# Patient Record
(demographics unavailable — no encounter records)

---

## 2019-03-27 NOTE — NUR
Patient up from ER. Alert and oriented. States pain 8/10 to right back and
flank. Medications given per orders. Patient able to communicate verbally and
read lips. Clip board and paper provided to aid in communication. Denies
further needs at this time.

## 2019-03-27 NOTE — NUR
PT SIGNED CONSENT FORM AND WAS ADVISED THAT SHE WILL BE NPO AFTER MIDNIGHT AND
THAT IS INCLUDING NO ICE CHIPS. PT VERBALIZED AND WROTE DOWN UNDERSTANDING OF
EVERYTHING. CALL LIGHT WITHIN REACH.

## 2019-03-27 NOTE — NUR
DR. MIR IN TO SPEAK WITH PT ABOUT SURGERY TOMORROW OR POSSIBLY
TONIGHT. DR. MIR ADVISED TO NOTIFY HIM IF TEMP IS GREATER THAN 101.0.
ALSO, TO HAVE PT SIGN CONSENT FORM.

## 2019-03-27 NOTE — NUR
Assisted patient to order dinner. Denies pain at this time. Denies further
needs at this time. Will report off to night shift.

## 2019-03-27 NOTE — NUR
PT IN BED WITH HOB AT 30 DEGREE ANGLE, AT TIMES SITS UP. FAMILY STATED THAT
SHE VOMITED, BUT PT COUGHS AND THEN SPITS UP MUCUS. PT IS DEAF, THEREFORE
COMMUNICATION AT TIMES DIFFICULT. PT'S SON CAN HEAR. PT RECEIVED PAIN
MEDICATION EARLIER THIS SHIFT FOR PAIN RATED AT A 7/10 IN RIGHT SIDE. PT ATE
80% OF HER SUPPER AND NO S/S OF PAIN OR DISCOMFORT AT THIS TIME. CALL LIGHT
WITHIN REACH.

## 2019-03-28 NOTE — NUR
UNEVENTFUL NIGHT FOR PT. PT SLEPT/RESTED MOST OF THE NIGHT AFTER MIDNIGHT. DID
CHANGE IV AT 0300, BUT PT WENT BACK TO SLEEP RIGHT AWAY. WENT IN TO CHECK ON
PT AND SHE DID NOT WAKE UP, BUT SHE DID MOVE AROUND A LOT. CALL LIGHT WITHIN
REACH.

## 2019-03-28 NOTE — NUR
SW attended clinical rounds to discuss discharge planning. Patient lives at
home with her  and children. Patient's PCP is Dr Manda Denson and she
obtains prescriptions from [facility]. Patient is deaf, SW contacted
patient's  to inquire about DPOA, HH, and DME. SW left a message.

## 2019-03-28 NOTE — NUR
PT HAD TAKEN A SHOW AND THEN AMBULATED IN ROOM AND IN [facility]. PT WAS VERY UPSET
BECAUSE THE KITCHEN DID NOT MAKE HER SUPPER RIGHT. BROUGHT SOME FOOD FOR THE
PT BECAUSE OF NOT GETTING THE RIGHT MEAL FROM THE KITCHEN.  PT DENIES PAIN OR
DISCOMFORT. NO FURTHER NEEDS CALL LIGHT WITHIN REACH.

## 2019-03-28 NOTE — NUR
PT'S IV INFILTRATED, STARTED NEW 20G IV IN RIGHT HAND, X2 ATTEMPS WITH NO
DIFFICULTIES, PT TOLERATED WELL. REMOVED IV FROM LEFT HAND, APPLIED PRESSURE
TILL BLEEDING STOPPED, PROTECTIVE DRSG APPLIED, AND CATHETER INTACT. CALL
LIGHT WITHIN REACH.

## 2019-03-28 NOTE — NUR
Primary nurse was assisted with [phone] patient care by [employer] [facility]
Elinor Sam and [employer] instructor Hope Jenkins RN-BC.

## 2019-03-28 NOTE — NUR
Received report, patient is observed laying in bed on right side with eyes
closed.  Call light is witin reach.

## 2019-03-28 NOTE — NUR
SW spoke with patient's spouse about discharge plans. Her spouse reports
the plan is to discharge home. Her spouse also reports patient does not have a
DPOA and she does not use any home health services or DME. SW does not
anticipate any discharge plans.

## 2019-03-29 NOTE — NUR
PT'S TELEMETRY KEEPS COMING OFF OF PT. RECEIVED CALL FROM [employer] THAT PT HAD A
BRIEF 6 SECOND PAUSE. WHEN ENTERING THE ROOM AND INVESTIGATING THE PROBLEM,
THE PT HAD THE TELEMETRY STRIPS IN DIFFERENT PLACES AND A COUPLE OF THEM OFF.
REAPPLIED STRIPS AND HAD NOT HAD ANYMORE PROBLEMS WITH TELEMETRY SO FAR. CALL
LIGHT WITHIN REACH.

## 2019-03-29 NOTE — NUR
watching TV, INT discontinued, discharge instructions given to patient and
verbalizes understanding,

## 2019-03-29 NOTE — NUR
after patient discharged found 2 prescriptions in chart written by Dr Brantley, called number listedand left message on an intepretor phone that
Rxs here at hospital

## 2019-03-29 NOTE — NUR
Assessment complete and charted. Patient A&O. reporting pain mid epigastric
5/10. Patient requested tylenol to be given with breakfast. IV LF hand, CDI.
VS WNL. SCD's bilateral legs. Nurse assisted patient with ordering breakfast
and informed that patients potassium is low and needs to be replaced. Patient
not happy, but will take potassium in juice. No further needs expressed from
patient. Call light within reach

## 2019-03-29 NOTE — NUR
Patient walking around in room, visitor at the bedside. VS stable. Mid
epigastric pain resolved with GI cocktail. IV CDI. Patient is hoping to go
home today. No further needs expressed from patient. Call light within reach

## 2019-04-04 NOTE — NUR
Patient rests in bed watching TV. Denies pain or nausea. Drinking fluids
without problems. VSS. Encouraged couphing and deep breathing frequently.
Family at bedside.

## 2019-04-04 NOTE — NUR
Pt. has met discharge criteria.  Pt. given discharge instructions, follow up
appointment info, scripts, education, and health summary.  Pt. denies
questions. INT discontinued.  Pt. escorted out by wheel chair with SALMA Fuentes.

## 2020-05-25 NOTE — NUR
AS C.I. > AD C.I. extracted; ear canals cleaned with curette/suction( AS)   RTC 6 months for ear cleaning      Contacted Dr. Brantley for urology consult.